# Patient Record
Sex: MALE | Race: BLACK OR AFRICAN AMERICAN | Employment: OTHER | URBAN - METROPOLITAN AREA
[De-identification: names, ages, dates, MRNs, and addresses within clinical notes are randomized per-mention and may not be internally consistent; named-entity substitution may affect disease eponyms.]

---

## 2022-04-19 ENCOUNTER — HOSPITAL ENCOUNTER (EMERGENCY)
Age: 52
Discharge: HOME | End: 2022-04-19
Attending: EMERGENCY MEDICINE
Payer: OTHER GOVERNMENT

## 2022-04-19 ENCOUNTER — APPOINTMENT (OUTPATIENT)
Dept: GENERAL RADIOLOGY | Age: 52
End: 2022-04-19
Attending: PHYSICIAN ASSISTANT
Payer: OTHER GOVERNMENT

## 2022-04-19 VITALS
RESPIRATION RATE: 14 BRPM | BODY MASS INDEX: 30.46 KG/M2 | HEART RATE: 71 BPM | WEIGHT: 245 LBS | TEMPERATURE: 97.8 F | HEIGHT: 75 IN | DIASTOLIC BLOOD PRESSURE: 93 MMHG | SYSTOLIC BLOOD PRESSURE: 147 MMHG | OXYGEN SATURATION: 97 %

## 2022-04-19 DIAGNOSIS — Z23 NEED FOR TDAP VACCINATION: ICD-10-CM

## 2022-04-19 DIAGNOSIS — S61.213A LACERATION OF MIDDLE FINGER OF LEFT HAND WITHOUT COMPLICATION, INITIAL ENCOUNTER: Primary | ICD-10-CM

## 2022-04-19 PROCEDURE — 74011250636 HC RX REV CODE- 250/636: Performed by: PHYSICIAN ASSISTANT

## 2022-04-19 PROCEDURE — 99284 EMERGENCY DEPT VISIT MOD MDM: CPT

## 2022-04-19 PROCEDURE — 73140 X-RAY EXAM OF FINGER(S): CPT

## 2022-04-19 PROCEDURE — 90471 IMMUNIZATION ADMIN: CPT

## 2022-04-19 PROCEDURE — 90715 TDAP VACCINE 7 YRS/> IM: CPT | Performed by: PHYSICIAN ASSISTANT

## 2022-04-19 RX ORDER — CEPHALEXIN 500 MG/1
500 CAPSULE ORAL 4 TIMES DAILY
Qty: 28 CAPSULE | Refills: 0 | Status: SHIPPED | OUTPATIENT
Start: 2022-04-19 | End: 2022-04-26

## 2022-04-19 RX ADMIN — TETANUS TOXOID, REDUCED DIPHTHERIA TOXOID AND ACELLULAR PERTUSSIS VACCINE, ADSORBED 0.5 ML: 5; 2.5; 8; 8; 2.5 SUSPENSION INTRAMUSCULAR at 12:57

## 2022-04-19 NOTE — ED PROVIDER NOTES
EMERGENCY DEPARTMENT HISTORY AND PHYSICAL EXAM    Date: 4/19/2022  Patient Name: Yosi Trivedi    History of Presenting Illness     Chief Complaint   Patient presents with    Laceration         History Provided By: Patient    Chief Complaint: laceration    HPI(Context):   11:19 AM  Yosi Trivedi is a 45 y.o. male, active duty, who presents to the emergency department C/O laceration to left middle finger. Associated sxs include pain with movement. Pt notes he cut his finger on a piece of glass at home. Pt cleaned wound. Concerned he may have glass in wound. Injury occurred 28 hours ago. Pt denies numbness, weakness, reduced ROM, and any other sxs or complaints. Pt unsure of he has UTD tetanus in last 5 years    PCP: Jessica        Past History     Past Medical History:  History reviewed. No pertinent past medical history. Past Surgical History:  History reviewed. No pertinent surgical history. Family History:  History reviewed. No pertinent family history. Social History:  Social History     Tobacco Use    Smoking status: Not on file    Smokeless tobacco: Not on file   Substance Use Topics    Alcohol use: Not on file    Drug use: Not on file       Allergies:  No Known Allergies      Review of Systems   Review of Systems   Musculoskeletal: Positive for arthralgias. Skin: Positive for wound. Negative for color change. Neurological: Negative for weakness and numbness. All other systems reviewed and are negative. Physical Exam     Vitals:    04/19/22 1058   BP: (!) 147/93   Pulse: 71   Resp: 14   Temp: 97.8 °F (36.6 °C)   SpO2: 97%   Weight: 111.1 kg (245 lb)   Height: 6' 3\" (1.905 m)     Physical Exam  Vitals and nursing note reviewed. Constitutional:       General: He is not in acute distress. Appearance: He is well-developed. He is not diaphoretic. Comments: AA male in NAD. Alert. In RW. Appears comfortable. HENT:      Head: Normocephalic and atraumatic.       Right Ear: External ear normal.      Left Ear: External ear normal.      Nose: Nose normal.   Eyes:      General: No scleral icterus. Right eye: No discharge. Left eye: No discharge. Conjunctiva/sclera: Conjunctivae normal.   Cardiovascular:      Pulses:           Radial pulses are 2+ on the right side and 2+ on the left side. Pulmonary:      Effort: Pulmonary effort is normal. No tachypnea or accessory muscle usage. Breath sounds: No decreased breath sounds. Musculoskeletal:         General: Normal range of motion. Left hand: Tenderness present. No swelling or deformity. Normal range of motion. Normal capillary refill. Normal pulse. Hands:       Cervical back: Normal range of motion. Skin:     General: Skin is warm and dry. Neurological:      Mental Status: He is alert and oriented to person, place, and time. Psychiatric:         Judgment: Judgment normal.             Diagnostic Study Results     Labs -   No results found for this or any previous visit (from the past 12 hour(s)). Radiologic Studies     XR 3RD FINGER LT MIN 2 V   Final Result      No fracture or radiodense foreign bodies. CT Results  (Last 48 hours)    None        CXR Results  (Last 48 hours)    None          Medications given in the ED-  Medications   diph,Pertuss(AC),Tet Vac-PF (BOOSTRIX) suspension 0.5 mL (0.5 mL IntraMUSCular Given 4/19/22 1257)         Medical Decision Making   I am the first provider for this patient. I reviewed the vital signs, available nursing notes, past medical history, past surgical history, family history and social history. Vital Signs-Reviewed the patient's vital signs. Pulse Oximetry Analysis - 97% on RA. NORMAL    Records Reviewed: Nursing Notes    Provider Notes (Medical Decision Making): 29 hour old superficial laceration to left third finger. No tendon involvement. No bleeding. NVI. Will image wound to assess for radiopaque FB. Update Tdap. Procedures:  Procedures    ED Course:   11:19 AM Initial assessment performed. The patients presenting problems have been discussed, and they are in agreement with the care plan formulated and outlined with them. I have encouraged them to ask questions as they arise throughout their visit. Diagnosis and Disposition       Imaging unremarkable. Discussed wound care at home. Rx ABX given wound open for 28 hours. No evidence of infection. Reasons to RTED discussed with pt. All questions answered. Pt feels comfortable going home at this time. Pt expressed understanding and she agrees with plan. 1. Laceration of middle finger of left hand without complication, initial encounter    2. Need for Tdap vaccination        PLAN:  1. D/C Home  2. Discharge Medication List as of 4/19/2022  1:03 PM      START taking these medications    Details   cephALEXin (Keflex) 500 mg capsule Take 1 Capsule by mouth four (4) times daily for 7 days. Indications: an infection of the skin and the tissue below the skin, Normal, Disp-28 Capsule, R-0           3. Follow-up Information     Follow up With Specialties Details Why Contact [facility]    [phone]    [facility] EMERGENCY DEPT Emergency Medicine   [address]  [phone]        _______________________________    Attestations: This note is prepared by Shanda Martino PA-C.  _______________________________          Please note that this dictation was completed with [employer], the computer voice recognition software. Quite often unanticipated grammatical, syntax, homophones, and other interpretive errors are inadvertently transcribed by the computer software. Please disregard these errors. Please excuse any errors that have escaped final proofreading.

## 2022-04-19 NOTE — ED TRIAGE NOTES
Pt presents for laceration to L - 3rd finger after cutting it on glass yesterday 0845. Tetanus UTD. Bandaid removed in triage.  Bleeding controlled